# Patient Record
Sex: MALE | Race: WHITE
[De-identification: names, ages, dates, MRNs, and addresses within clinical notes are randomized per-mention and may not be internally consistent; named-entity substitution may affect disease eponyms.]

---

## 2022-10-27 ENCOUNTER — HOSPITAL ENCOUNTER (EMERGENCY)
Dept: HOSPITAL 26 - MED | Age: 22
Discharge: HOME | End: 2022-10-27
Payer: COMMERCIAL

## 2022-10-27 VITALS — WEIGHT: 179 LBS | HEIGHT: 70 IN | BODY MASS INDEX: 25.62 KG/M2

## 2022-10-27 VITALS — DIASTOLIC BLOOD PRESSURE: 56 MMHG | SYSTOLIC BLOOD PRESSURE: 118 MMHG

## 2022-10-27 DIAGNOSIS — M25.552: ICD-10-CM

## 2022-10-27 DIAGNOSIS — M25.551: ICD-10-CM

## 2022-10-27 DIAGNOSIS — G89.29: Primary | ICD-10-CM

## 2022-10-27 DIAGNOSIS — M25.512: ICD-10-CM

## 2022-10-27 DIAGNOSIS — M25.511: ICD-10-CM

## 2022-10-27 DIAGNOSIS — Z79.899: ICD-10-CM

## 2022-10-27 NOTE — NUR
Patient discharged with v/s stable. Written and verbal after care instructions 
FOR HIP PAIN given and explained. 

Patient alert, oriented and verbalized understanding of instructions. 
Ambulatory with steady gait. All questions addressed prior to discharge. ID 
band removed. Patient advised to follow up with PMD. Rx of TRAMADOL given.  
Opportunity to ask questions provided and answered.

## 2022-10-27 NOTE — NUR
22/M PRESENTS TO ED WITH C/O BILATERAL HIP AND SHOULDER PAIN X1 YEAR. DENIES 
RECENT INJURY OR TRAUMA. STATES HE HAD RIGHT HIP SURGERY LAST YEAR AND HAS HAD 
PAIN SINCE BUT IS WAITING TO SEE A SPECIALIST. REPORTS WAITING TO FILL RX OF 
GABAPENTIN.

## 2024-05-10 ENCOUNTER — HOSPITAL ENCOUNTER (EMERGENCY)
Dept: HOSPITAL 4 - SED | Age: 24
Discharge: HOME | End: 2024-05-10
Payer: COMMERCIAL

## 2024-05-10 VITALS
TEMPERATURE: 98.3 F | OXYGEN SATURATION: 98 % | SYSTOLIC BLOOD PRESSURE: 118 MMHG | RESPIRATION RATE: 18 BRPM | HEART RATE: 81 BPM

## 2024-05-10 VITALS — WEIGHT: 184 LBS | BODY MASS INDEX: 26.34 KG/M2 | HEIGHT: 70 IN

## 2024-05-10 VITALS
OXYGEN SATURATION: 98 % | SYSTOLIC BLOOD PRESSURE: 118 MMHG | RESPIRATION RATE: 18 BRPM | TEMPERATURE: 98.3 F | HEART RATE: 81 BPM

## 2024-05-10 DIAGNOSIS — J02.9: Primary | ICD-10-CM
